# Patient Record
Sex: MALE | Race: WHITE | URBAN - METROPOLITAN AREA
[De-identification: names, ages, dates, MRNs, and addresses within clinical notes are randomized per-mention and may not be internally consistent; named-entity substitution may affect disease eponyms.]

---

## 2017-01-25 ENCOUNTER — OFFICE VISIT (OUTPATIENT)
Dept: SLEEP MEDICINE | Facility: CLINIC | Age: 32
End: 2017-01-25
Attending: INTERNAL MEDICINE
Payer: COMMERCIAL

## 2017-01-25 VITALS
BODY MASS INDEX: 30.61 KG/M2 | RESPIRATION RATE: 16 BRPM | WEIGHT: 226 LBS | HEIGHT: 72 IN | HEART RATE: 60 BPM | DIASTOLIC BLOOD PRESSURE: 67 MMHG | SYSTOLIC BLOOD PRESSURE: 120 MMHG | OXYGEN SATURATION: 97 %

## 2017-01-25 DIAGNOSIS — G47.21 SLEEP PHASE SYNDROME, DELAYED: ICD-10-CM

## 2017-01-25 DIAGNOSIS — R06.83 SNORING: ICD-10-CM

## 2017-01-25 DIAGNOSIS — F45.8 BRUXISM: ICD-10-CM

## 2017-01-25 DIAGNOSIS — R06.81 APNEA: Primary | ICD-10-CM

## 2017-01-25 DIAGNOSIS — R53.82 CHRONIC FATIGUE: ICD-10-CM

## 2017-01-25 PROCEDURE — 99211 OFF/OP EST MAY X REQ PHY/QHP: CPT | Mod: ZF

## 2017-01-25 RX ORDER — ZOLPIDEM TARTRATE 10 MG/1
TABLET ORAL
Qty: 1 TABLET | Refills: 0 | Status: SHIPPED | OUTPATIENT
Start: 2017-01-25

## 2017-01-25 NOTE — PROGRESS NOTES
"New Sleep Patient Note:    Date on this visit: 1/25/2017    Palomo Long is self-referred for difficulty with morning awakenings and concern for possible obstructive sleep apnea.    Primary Physician: He does not have a primary care provider    CC:  \"I often feel fatigued in the morning.  I suspect I have sleep apnea.\"    Palomo Long 31 year old complains of have a hard getting up in the morning as well as gasping for air sometimes as he is falling asleep.  The gasping for air has been going on for the past few months.  Palomo also complains of snoring.  Snoring has been going on for the last couple years.  He has never been told by family members or a roommate in college that he snores.  Sometimes his wife will have to shake him because he snores loudly and it disrupts her sleep.  In addition to snoring, he has snort arousals (only after drinking alcohol), dry mouth, mouth breathing, morning headaches (1/month), non-refreshing sleep, daytime fatigue. Denies any witnessed apneas.  Denies any significant weight gain or trauma to the neck/airway.  He has lost about 20 pounds from working out, eating healthy, and quitting alcohol.  He has not had a previous sleep study.  A co-worker was recently diagnosed with ANNA and started on PAP therapy.  He was concerned that he may have it as well.    Palomo goes to bed at 11PM to 12 AM.  It usually takes him 15 minutes or less to fall asleep.  He wakes up at 7AM to 8AM with an alarm.  He will hit the snooze button 2-3 times.  He uses a \"light alarm.\"  He feels very tired in the morning.  Caffeine will usually help him wake up.  Bedtime routine conists of being on the computer in the basement.  He then goes upstairs to bed.  The bedroom is quiet and dark. Denies any electronics in bed or TV in the room.  He has used a sleep aid in the past.  He took 3mg melatonin right before bed.  He used or it intermittently within the past year then stopped.  On the weekends/vacation, he " falls asleep at 1AM to 2 AM and gets up 9:30AM to 10AM.  Patient describes himmself as a night person.  He would prefer to go to sleep at 1-2:00 AM and wake up at 10:00 AM.  He denies any complaints of difficulty with falling asleep.  However, he does have issues with staying asleep.  He wakes up 3-5 times throughout the night, about 2 nights in a row, about twice per month.  Night time awakenings occurs due to unspecific causes.  Denies any pain, RLS, racing thoughts/stress/anxiety at night.  States that when he wakes up often and feels as if he never fell asleep.  Patient does have a regular bed partner.  Patient sleeps on his side.  If he sleeps on his back, the gasping will occur more often.  Patient does have a pet in the bedroom at night during sleep.  Denies any awakenings from the dog at night.    Palomo naps 1-2 times per month for  minutes, feels refreshed after naps.  If any longer then he will not feel refreshed.  He does not take inadvertant naps.  He denies closing eyes, dozing and falling asleep while driving.  There are times when he is sleepy at work and will want to take a nap.  He will just drink some caffeine, which will help him stay awake.  Patient's [place] Sleepiness score 6/24 consistent with mild daytime sleepiness.  He denies any cataplexy, sleep paralysis, sleep hallucinations.  Palomo does not complain of restlessness feelings in the legs/arms, worse at night when trying to sleep.  He does not complain of spontaneous leg movements/jerks in the middle of the night.  He will have hynpic jerks at times, once per week.  He does not have a history of anemia.  He denies any night time behaviors -  sleep talking, sleep eating.  He has been told that he clenches his teeth at night.  He used to have sleep walking episodes after he had been drinking alcohol.  He quit drinking and has not had any episodes.  He does not complain acting out dreams.  Patient denies any injury to oneself or others  "while sleeping.  Patient has not fallen out of bed.  Palomo currently works as a analyst.  His work schedule is as follows:  9am to 5pm.  He has not done any shift work in the past.  He drinks coffee, tea, and soda.  He used to drink energy drinks.  He drinks about 200mg caffeine per day.  Last caffeine intake is usually before 3pm.  He does not drink alcohol.  Patient does not smoke.  He did smoke \"socially,\" stopped about 4 years ago.  He does not have any future surgeries planned.  He has a had his wisdom teeth removed about 12-13 years ago, under sedation.  He was not told he had any apneic events at that time.  He does admit that he has some anxiety symptoms.  He has not been evaluated or on any medications for anxiety currently, or in the past.    Allergies:    No Known Allergies    Medications:    Current Outpatient Prescriptions   Medication Sig Dispense Refill     imiquimod (ALDARA) 5 % cream Apply a small sized amount to warts or molluscum three times weekly at bedtime.   Wash off after 8 hours.   May use for up to 16 weeks. 12 packet 3       Problem List:  There are no active problems to display for this patient.       Past Medical/Surgical History:  History reviewed. No pertinent past medical history.  Past Surgical History   Procedure Laterality Date     [place] teeth         Social History:  Social History     Social History     Marital Status:      Spouse Name: N/A     Number of Children: N/A     Years of Education: N/A     Occupational History     Not on file.     Social History Main Topics     Smoking status: Never Smoker      Smokeless tobacco: Not on file     Alcohol Use: No     Drug Use: No     Sexual Activity:     Partners: Female     Birth Control/ Protection: None     Other Topics Concern     Not on file     Social History Narrative       Family History:  Family History   Problem Relation Age of Onset     Family History Negative Mother      Family History Negative Father      HEART DISEASE " "Maternal Grandmother      HEART DISEASE Maternal Grandfather      DIABETES Paternal Grandmother      HEART DISEASE Paternal Grandmother      HEART DISEASE Paternal Grandfather      Parkinsonism Paternal Grandfather    No known sleep disorders    Review of Systems:  A complete review of systems reviewed by me is negative with the exeption of what has been mentioned in the history of present illness.  CONSTITUTIONAL: NEGATIVE for weight gain, fever, chills, sweats or night sweats, drug allergies.  EYES: NEGATIVE for changes in vision, blind spots, double vision.  ENT: NEGATIVE for ear pain, sore throat, sinus pain, post-nasal drip, runny nose, bloody nose  CARDIAC: NEGATIVE for fast heartbeats or fluttering in chest, breathlessness when lying flat, swollen legs or swollen feet.  POSITIVE occasional chest pressure/pain   NEUROLOGIC: NEGATIVE headaches, weakness or numbness in the arms or legs.  DERMATOLOGIC: NEGATIVE for rashes, new moles or change in mole(s)  PULMONARY: NEGATIVE SOB at rest, SOB with activity, dry cough, productive cough, coughing up blood, wheezing or whistling when breathing.    GASTROINTESTINAL: NEGATIVE for nausea or vomitting, fat or grease in stools, constipation, abdominal pain, bowel movements black in color or blood noted.  POSITIVE  loose or watery stools  GENITOURINARY: NEGATIVE for pain during urination, blood in urine, urinating more frequently than usual, irregular menstrual periods.  MUSCULOSKELETAL: NEGATIVE for muscle pain, bone or joint pain, swollen joints.  ENDOCRINE: NEGATIVE for increased thirst or urination, diabetes.  LYMPHATIC: NEGATIVE for swollen lymph nodes, lumps or bumps in the breasts or nipple discharge.    Physical Examination:  Vitals: Pulse 60  Resp 16  Ht 1.835 m (6' 0.25\")  Wt 102.513 kg (226 lb)  BMI 30.44 kg/m2  SpO2 97%  BMI= Body mass index is 30.44 kg/(m^2).         [place] Total Score 1/25/2017   Total score - [place] 6         Mallampati Class: " II.  Tonsillar Stage: 1  hidden by pillars.    General: No apparent distress, appropriately groomed  Head: Normocephalic, atraumatic  Eyes: no icterus, PERRL  Nose: Nares patent. Opening more narrowed on the left, No exudate/erythema  Mouth: op pink and moist, teeth: normal bite  Orophraynx: Opening is not narrowed  Neck: Supple, Circumference: 16 inches  Cardiac: Regular rate and rhythm  Chest: Symmetric air movement, lungs clear to auscultation bilaterally  GI: Abdomen soft, non-tender, non-distended  Musculoskeletal: noedema noted  Skin: Warm, dry, intact  Psych: Mood pleasant, affect congruent  Mental status: Awake, alert, attentive, oriented.  Cranial nerves: CN2-12 tested and no significant findings appreciated.  Motor: Tone within normal limit  Gait: Normal width, stride length       Impression/Plan:    Snoring  Possible Obstructive Sleep Apnea  Delayed Sleep Phase Type  Insomnia - Psychophysiologic, Delayed Sleep Phase , Anxiety  Bruxism    31M here for evaluation for possible sleep apnea.  He is intermediate risk for ANNA, STOP-BANG is 3.  He has loud, disruptive snoring, choking/gasping for air, non-refreshing sleep, daytime fatigue.  For diagnosis, he has two diagnostic options.  In-lab study will be more sensitive in a person who is intermediate risk, which has been ordered.  Bruxism can also be monitored on the PSG.  If not eligible for insurance reasons, he should undergo Home Sleep Testing.  He has been explained the diagnosis and treatment of ANNA.  He has also been educated on the risks of untreated ANNA.  He will follow up in clinic to discuss results and treatment options best suited for him.    He also has a clinical history suggestive of Delayed Sleep Phase Type.  Treatment options include Melatonin and Light Therapy.  He has been instructed to take Melatonin (0.5mg to 1mg) at 8pm.  He will then take it 30 minutes earlier the next week and continue until desired bed time.  He has been instructed  not to drive after he takes the medication, as this can make him drowsy.  He should use a light box (10,000 lux) in the morning, after 8am, for about 30min to 1 hour.  He has been counseled that if he were to start this therapy, he needs to be consistent in order for this treatment to work.    He has a couple days of sleep maintenance insomnia a month.  He is unable to provide any specific details on the cause.  This could be related to uncontrolled ANNA and Delayed Sleep Phase Type.  Although there is no formal diagnosis of Anxiety, this could be contributing as well.  Encouraged him to follow good sleep hygiene techniques (sleep hygiene and stimulus control).  He would also benefit from Cognitive Behavioral Therapy.  This can be further explored at the next visit.  If Anxiety is an issues, he would benefit from evaluation from Psychiatry for optimal management.    Patient was strongly advised to avoid driving, operating any heavy machinery or other hazardous situations while drowsy or sleepy.  Patient was counseled on the importance of driving while alert, to pull over if drowsy, or nap before getting into the vehicle if sleepy.    He will follow up with me in approximately two weeks after his sleep study has been competed to review the results and discuss plan of care.      Seen and examined with Dr. Veena Jacobo  Clinical Sleep Medicine Fellow    Attending: Patient seen and examined and personally counseled re above..  PSG vs HSAT testing sensitivity and indications personally reviewed with patient. This note reflects our mutual assessment and plan.  Crista Curiel MD  Pulmonary, Critical Care, and Sleep Medicine

## 2017-01-25 NOTE — NURSING NOTE
"Chief Complaint   Patient presents with     Consult     Discuss fatigue and possible sleep apnea       Initial There were no vitals taken for this visit. Estimated body mass index is 30.04 kg/(m^2) as calculated from the following:    Height as of 10/20/16: 1.835 m (6' 0.25\").    Weight as of 10/20/16: 101.152 kg (223 lb).  BP completed using cuff size: large      Neck circumference: 16 inches.  40.5 cm    [address]          "

## 2017-01-25 NOTE — PATIENT INSTRUCTIONS
"MY TREATMENT INFORMATION FOR SLEEP APNEA-  Palomo Long    DOCTOR : Crista Curiel  SLEEP CENTER :      MY CONTACT NUMBER:       If I haven't had a sleep study yet, what can I expect?  A personal story from [employer]  https://www.[employer].com/watch?v=AxPLmlRpnCs    Am I having a home sleep study?  Here is a video in case you get home and want to make sure you have done it correctly  https://www.[employer].com/watch?v=ESV8X2jPbh3&feature=youtu.be    Frequently asked questions:  1. What is Obstructive Sleep Apnea (ANNA)? ANNA is the most common type of sleep apnea. Apnea literally means, \"without breath.\" It is characterized by repetitive pauses in breathing, despite continued effort to breathe, and is usually associated with a reduction in blood oxygen saturation. Apneas can last 10 to over 60 seconds. It is caused by narrowing or collapse of the upper airway as muscles relax during sleep. Severity of sleep apnea is determined by frequency of breathing events and their effect on your sleep and oxygen levels determined during sleep testing.   2. What are the consequences of ANNA? Symptoms include: daytime sleepiness- possibly increasing the risk of falling asleep while driving, unrefreshing/restless sleep, snoring, insomnia, waking frequently to urinate, waking with heartburn or reflux, reduced concentration and memory, and morning headaches. Other health consequences may include development of high blood pressure and other cardiovascular disease in persons who are susceptible. Untreated ANNA  can contribute to heart disease, stroke and diabetes.   3. What are the treatment options? In most situations, sleep apnea is a lifelong disease that must be managed with daily therapy. Medications are not effective for sleep apnea and surgery is generally not performed until other therapies have been tried. Therapy is usually tailored to the individual patient based on many factors including your wishes as well as severity of sleep " apnea and severity of obesity. Continuous Positive Airway (CPAP) is the most reliable treatment. An oral device to hold your jaw forward is usually the next most reliable option. Other options include postioning devices (to keep you off your back), weight loss, and surgery including a tongue pacing device. There is more detail about some of these options below.            1. CPAP-  WHAT DOES IT DO AND HOW CAN I LEARN TO WEAR IT?                               BEFORE I START, CAN I WATCH A MOVIE TO GET A PLAN ON HOW TO USE CPAP?  https://www.[employer].com/watch?k=h5E64cn866W      Continuous positive airway pressure, or CPAP, is the most effective treatment for obstructive sleep apnea. It works by blowing room air, through a mask, to hold your throat open. A decision to use CPAP is a major step forward in the pursuit of a healthier life. The successful use of CPAP will help you breathe easier, sleep better and live healthier. You can choose CPAP equipment from any durable medical equipment provider that meets your needs.  Using CPAP can be a positive experience if you keep these lora points in mind:  1. Commitment  CPAP is not a quick fix for your problem. It involves a long-term commitment to improve your sleep and your health.    2. Communication  Stay in close communication with both your sleep doctor and your CPAP supplier. Ask lots of questions and seek help when you need it.    3. Consistency  Use CPAP all night, every night and for every nap. You will receive the maximum health benefits from CPAP when you use it every time that you sleep. This will also make it easier for your body to adjust to the treatment.    4. Correction  The first machine and mask that you try may not be the best ones for you. Work with your sleep doctor and your CPAP supplier to make corrections to your equipment selection. Ask about trying a different type of machine or mask if you have ongoing problems. Make sure that your mask is a good  "fit and learn to use your equipment properly.    5. Challenge  Tell a family member or close friend to ask you each morning if you used your CPAP the previous night. Have someone to challenge you to give it your best effort.    6. Connection   Your adjustment to CPAP will be easier if you are able to connect with others who use the same treatment. Ask your sleep doctor if there is a support group in your area for people who have sleep apnea, or look for one on the Internet.  7. Comfort   Increase your level of comfort by using a saline spray, decongestant or heated humidifier if CPAP irritates your nose, mouth or throat. Use your unit's \"ramp\" setting to slowly get used to the air pressure level. There may be soft pads you can buy that will fit over your mask straps. Look on www.CPAP.com for accessories that can help make CPAP use more comfortable.  8. Cleaning   Clean your mask, tubing and headgear on a regular basis. Put this time in your schedule so that you don't forget to do it. Check and replace the filters for your CPAP unit and humidifier.    9. Completion   Although you are never finished with CPAP therapy, you should reward yourself by celebrating the completion of your first month of treatment. Expect this first month to be your hardest period of adjustment. It will involve some trial and error as you find the machine, mask and pressure settings that are right for you.    10. Continuation  After your first month of treatment, continue to make a daily commitment to use your CPAP all night, every night and for every nap.    CPAP-Tips to starting with success:  Begin using your CPAP for short periods of time during the day while you watch TV or read.    Use CPAP every night and for every nap. Using it less often reduces the health benefits and makes it harder for your body to get used to it.    Make small adjustments to your mask, tubing, straps and headgear until you get the right fit. Tightening the mask " may actually worsen the leak.  If it leaves significant marks on your face or irritates the bridge of your nose, it may not be the best mask for you.  Speak with the person who supplied the mask and consider trying other masks. Insurances will allow you to try different masks during the first month of starting CPAP.  Insurance also covers a new mask, hose and filter about every 6 months.    Use a saline nasal spray to ease mild nasal congestion. Neti-Pot or saline nasal rinses may also help. Nasal gel sprays can help reduce nasal dryness.  Biotene mouthwash can be helpful to protect your teeth if you experience frequent dry mouth.  Dry mouth may be a sign of air escaping out of your mouth or out of the mask in the case of a full face mask.  Speak with your provider if you expect that is the case.     Take a nasal decongestant to relieve more severe nasal or sinus congestion.  Do not use Afrin (oxymetazoline) nasal spray more than 3 days in a row.  Speak with your sleep doctor if your nasal congestion is chronic.    Use a heated humidifier that fits your CPAP model to enhance your breathing comfort. Adjust the heat setting up if you get a dry nose or throat, down if you get condensation in the hose or mask.  Position the CPAP lower than you so that any condensation in the hose drains back into the machine rather than towards the mask.    Try a system that uses nasal pillows if traditional masks give you problems.    Clean your mask, tubing and headgear once a week. Make sure the equipment dries fully.    Regularly check and replace the filters for your CPAP unit and humidifier.    Work closely with your sleep provider and your CPAP supplier to make sure that you have the machine, mask and air pressure setting that works best for you. It is better to stop using it and call your provider to solve problems than to lay awake all night frustrated with the device.    BESIDES CPAP, WHAT OTHER THERAPIES ARE  THERE?      Positioning Device  Positioning devices are generally used when sleep apnea is mild and only occurs on your back.This example shows a pillow that straps around the waist. It may be appropriate for those whose sleep study shows milder sleep apnea that occurs primarily when lying flat on one's back. Preliminary studies have shown benefit but effectiveness at home may need to be verified by a home sleep test. These devices are generally not covered by medical insurance.                      Oral Appliance  What is oral appliance therapy?  An oral appliance is a small acrylic device that fits over the upper and lower teeth or tongue (similar to an orthodontic retainer or a mouth guard). This device slightly advances the lower jaw or tongue, which moves the base of the tongue forward, opens the airway, improves breathing and can effectively treat snoring and obstructive sleep apnea sleep apnea. The appliance is fabricated and customized by a qualified dentist with experience in treating snoring and sleep apnea. Oral appliances are usually well tolerated and have relatively high compliance by patients1, 2, 3.  When is an oral appliance indicated?  Oral appliance therapy is recommended as a first-line treatment for patients with primary snoring, mild sleep apnea, and for patients with moderate sleep apnea who prefer appliance therapy to use of CPAP4, 5. Severity of sleep apnea is determined by sleep testing and is based on the number of respiratory events per hour of sleep.   How successful is oral appliance therapy?  The success rate of oral appliance therapy in patients with mild sleep apnea is 75-80% while in patients with moderate sleep apnea it is 50-70%. The chance of success in patients with severe sleep apnea is 40-50%. The research also shows that oral appliances have a beneficial effect on the cardiovascular health of ANNA patients at the same magnitude as CPAP therapy7.  Oral appliances should be a  second-line treatment in cases of severe sleep apnea, but if not completely successful then a combination therapy utilizing CPAP plus oral appliance therapy may be effective. Oral appliances tend to be effective in a broad range of patients although studies show that the patients who have the highest success are females, younger patients, those with milder disease, and less severe obesity. 3, 6.   The chances of success are lower in patients who have more severe ANNA, are older, and those who are morbidly obese.     Example of an oral appliance   Finding a dentist that practices dental sleep medicine  Specific training is available through the American Academy of Dental Sleep Medicine for dentists interested in working in the field of sleep. To find a dentist who is educated in the field of sleep and the use of oral appliances, near you, visit the Web site of the American Academy of Dental Sleep Medicine; also see   http://www.accpstorage.org/newOrganization/patients/oralAppliances.pdf  To search for a dentist certified in these practices:  Http://aadsm.org/FindADentist.aspx?1  1. Korina et al. Objectively measured vs self-reported compliance during oral appliance therapy for sleep-disordered breathing. Chest 2013; 144(5): 4519-4120.  2. Brock, et al. Objective measurement of compliance during oral appliance therapy for sleep-disordered breathing. Thorax 2013; 68(1): 91-96.  3. Karen, et al. Mandibular advancement devices in 620 men and women with ANNA and snoring: tolerability and predictors of treatment success. Chest 2004; 125: 1716-4626.  4. Nahum, et al. Oral appliances for snoring and ANNA: a review. Sleep 2006; 29: 244-262.  5. Sushila et al. Oral appliance treatment for ANNA: an update. J Clin Sleep Med 2014; 10(2): 215-227.  6. Silvia et al. Predictors of OSAH treatment outcome. J Dent Res 2007; 86: 9716-1089.      Surgery:    Upper Airway Surgery for ANNA  Surgery for ANNA is a second-line  treatment option in the management of sleep apnea.  Surgery should be considered for patients who are having a difficult time tolerating CPAP.    Surgery for ANNA is directed at areas that are responsible for narrowing or complete obstruction of the airway during sleep.  There are a wide range of procedures available to enlarge and/or stabilize the airway to prevent blockage of breathing in the three major areas where it can occur: the palate, tongue, and nasal regions.  Successful surgical treatment depends on the accurate identification of the factors responsible for obstructive sleep apnea in each person.  A personalized approach is required because there is no single treatment that works well for everyone.  Because of anatomic variation, consultation with an examination by a sleep surgeon is a critical first step in determining what surgical options are best for each patient.  In some cases, examination during sedation may be recommended in order to guide the selection of procedures.  Patients will be counseled about risks and benefits as well as the typical recovery course after surgery. Surgery is typically not a cure for a person s ANNA.  However, surgery will often significantly improve one s ANNA severity (termed  success rate ).  Even in the absence of a cure, surgery will decrease the cardiovascular risk associated with OSA7; improve overall quality of life8 (sleepiness, functionality, sleep quality, etc).          Palate Procedures:  Patients with ANNA often have narrowing of their airway in the region of their tonsils and uvula.  The goals of palate procedures are to widen the airway in this region as well as to help the tissues resist collapse.  Modern palate procedure techniques focus on tissue conservation and soft tissue rearrangement, rather than tissue removal.  Often the uvula is preserved in this procedure. Residual sleep apnea is common in patient after pharyngoplasty with an average reduction in  sleep apnea events of 33%2.      Tongue Procedures:  While patients are awake, the muscles that surround the throat are active and keep this region open for breathing. These muscles relax during sleep, allowing the tongue and other structures to collapse and block breathing.  There are several different tongue procedures available.  Selection of a tongue base procedure depends on characteristics seen on physical exam.  Generally, procedures are aimed at removing bulky tissues in this area or preventing the back of the tongue from falling back during sleep.  Success rates for tongue surgery range from 50-62%3.    Hypoglossal Nerve Stimulation:  Hypoglossal nerve stimulation has recently received approval from the [place] Food and Drug Administration for the treatment of obstructive sleep apnea.  This is based on research showing that the system was safe and effective in treating sleep apnea6.  Results showed that the median AHI score decreased 68%, from 29.3 to 9.0. This therapy uses an implant system that senses breathing patterns and delivers mild stimulation to airway muscles, which keeps the airway open during sleep.  The system consists of three fully implanted components: a small generator (similar in size to a pacemaker), a breathing sensor, and a stimulation lead.  Using a small handheld remote, a patient turns the therapy on before bed and off upon awakening.    Candidates for this device must be greater than 22 years of age, have moderate to severe ANNA (AHI between 20-65), BMI less than 32, have tried CPAP/oral appliance without success, and have appropriate upper airway anatomy (determined by a sleep endoscopy performed by Dr. Mcqueen).    Hypoglossal Nerve Stimulation Pathway:    The sleep surgeon s office will work with the patient through the insurance prior-authorization process (including communications and appeals).    Nasal Procedures:  Nasal obstruction can interfere with nasal breathing during  the day and night.  Studies have shown that relief of nasal obstruction can improve the ability of some patients to tolerate positive airway pressure therapy for obstructive sleep apnea1.  Treatment options include medications such as nasal saline, topical corticosteroid and antihistamine sprays, and oral medications such as antihistamines or decongestants. Non-surgical treatments can include external nasal dilators for selected patients. If these are not successful by themselves, surgery can improve the nasal airway either alone or in combination with these other options.      Combination Procedures:  Combination of surgical procedures and other treatments may be recommended, particularly if patients have more than one area of narrowing or persistent positional disease.  The success rate of combination surgery ranges from 66-80%2,3.      1. Steve BRENNAN. The Role of the Nose in Snoring and Obstructive Sleep Apnoea: An Update.  Eur Arch Otorhinolaryngol. 2011; 268: 1365-73.  2.  Silvestre SM; Miracle JA; Asuncion JR; Pallanch JF; Socorro MB; Triny SG; Zhanna PAULINO. Surgical modifications of the upper airway for obstructive sleep apnea in adults: a systematic review and meta-analysis. SLEEP 2010;33(10):8672-0031. Alan FERNANDEZ. Hypopharyngeal surgery in obstructive sleep apnea: an evidence-based medicine review.  Arch Otolaryngol Head Neck Surg. 2006 Feb;132(2):206-13.  3. Richard TOPETEH1, Umu Y, Ze SHAHAB. The efficacy of anatomically based multilevel surgery for obstructive sleep apnea. Otolaryngol Head Neck Surg. 2003 Oct;129(4):327-35.  4. Alan FERNANDEZ, Goldberg A. Hypopharyngeal Surgery in Obstructive Sleep Apnea: An Evidence-Based Medicine Review. Arch Otolaryngol Head Neck Surg. 2006 Feb;132(2):206-13.  5. Alirio HADDAD et al. Upper-Airway Stimulation for Obstructive Sleep Apnea.  N Engl J Med. 2014 Jan 9;370(2):139-49.  6. Sheila Y et al. Increased Incidence of Cardiovascular Disease in Middle-aged Men with Obstructive Sleep Apnea.  Am J Respir Crit Care Med; 2002 166: 159-165  7. Kitty VALDES et al. Studying Life Effects and Effectiveness of Palatopharyngoplasty (SLEEP) study: Subjective Outcomes of Isolated Uvulopalatopharyngoplasty. Otolaryngol Head Neck Surg. 2011; 144: 623-631.              Your BMI is Body mass index is 30.44 kg/(m^2).  Weight management is a personal decision.  If you are interested in exploring weight loss strategies, the following discussion covers the approaches that may be successful. Body mass index (BMI) is one way to tell whether you are at a healthy weight, overweight, or obese. It measures your weight in relation to your height.  A BMI of 18.5 to 24.9 is in the healthy range. A person with a BMI of 25 to 29.9 is considered overweight, and someone with a BMI of 30 or greater is considered obese. More than two-thirds of American adults are considered overweight or obese.  Being overweight or obese increases the risk for further weight gain. Excess weight may lead to heart disease and diabetes.  Creating and following plans for healthy eating and physical activity may help you improve your health.  Weight control is part of healthy lifestyle and includes exercise, emotional health, and healthy eating habits. Careful eating habits lifelong are the mainstay of weight control. Though there are significant health benefits from weight loss, long-term weight loss with diet alone may be very difficult to achieve- studies show long-term success with dietary management in less than 10% of people. Attaining a healthy weight may be especially difficult to achieve in those with severe obesity. In some cases, medications, devices and surgical management might be considered.  What can you do?  If you are overweight or obese and are interested in methods for weight loss, you should discuss this with your provider.     Consider reducing daily calorie intake by 500 calories.     Keep a food journal.     Avoiding skipping meals,  consider cutting portions instead.    Diet combined with exercise helps maintain muscle while optimizing fat loss. Strength training is particularly important for building and maintaining muscle mass. Exercise helps reduce stress, increase energy, and improves fitness. Increasing exercise without diet control, however, may not burn enough calories to loose weight.       Start walking three days a week 10-20 minutes at a time    Work towards walking thirty minutes five days a week     Eventually, increase the speed of your walking for 1-2 minutes at time    In addition, we recommend that you review healthy lifestyles and methods for weight loss available through the National Institutes of Health patient information sites:  http://win.niddk.nih.gov/publications/index.htm    And look into health and wellness programs that may be available through your health insurance provider, employer, local community center, or [employer].    Weight management plan: Patient was referred to their PCP to discuss a diet and exercise plan.

## 2017-01-25 NOTE — MR AVS SNAPSHOT
"              After Visit Summary   1/25/2017    Palomo Long    MRN: [id number]           Patient Information     Date Of Birth          1985        Visit Information        Provider Department      1/25/2017 1:00 PM Hilario Jacobo MD [facility], [place], Sleep Study        Today's Diagnoses     Snoring    -  1       Care Instructions    MY TREATMENT INFORMATION FOR SLEEP APNEA-  Palomo Long    DOCTOR : Crista Curiel  SLEEP CENTER :      MY CONTACT NUMBER:       If I haven't had a sleep study yet, what can I expect?  A personal story from [employer]  https://www.[employer].com/watch?v=AxPLmlRpnCs    Am I having a home sleep study?  Here is a video in case you get home and want to make sure you have done it correctly  https://www.[employer].com/watch?v=LMB1Y9wDzk7&feature=youtu.be    Frequently asked questions:  1. What is Obstructive Sleep Apnea (ANNA)? ANNA is the most common type of sleep apnea. Apnea literally means, \"without breath.\" It is characterized by repetitive pauses in breathing, despite continued effort to breathe, and is usually associated with a reduction in blood oxygen saturation. Apneas can last 10 to over 60 seconds. It is caused by narrowing or collapse of the upper airway as muscles relax during sleep. Severity of sleep apnea is determined by frequency of breathing events and their effect on your sleep and oxygen levels determined during sleep testing.   2. What are the consequences of ANNA? Symptoms include: daytime sleepiness- possibly increasing the risk of falling asleep while driving, unrefreshing/restless sleep, snoring, insomnia, waking frequently to urinate, waking with heartburn or reflux, reduced concentration and memory, and morning headaches. Other health consequences may include development of high blood pressure and other cardiovascular disease in persons who are susceptible. Untreated ANNA  can contribute to heart disease, stroke and diabetes.   3. What are the treatment options? " In most situations, sleep apnea is a lifelong disease that must be managed with daily therapy. Medications are not effective for sleep apnea and surgery is generally not performed until other therapies have been tried. Therapy is usually tailored to the individual patient based on many factors including your wishes as well as severity of sleep apnea and severity of obesity. Continuous Positive Airway (CPAP) is the most reliable treatment. An oral device to hold your jaw forward is usually the next most reliable option. Other options include postioning devices (to keep you off your back), weight loss, and surgery including a tongue pacing device. There is more detail about some of these options below.            1. CPAP-  WHAT DOES IT DO AND HOW CAN I LEARN TO WEAR IT?                               BEFORE I START, CAN I WATCH A MOVIE TO GET A PLAN ON HOW TO USE CPAP?  https://www.[employer].com/watch?o=e2O29eo447Q      Continuous positive airway pressure, or CPAP, is the most effective treatment for obstructive sleep apnea. It works by blowing room air, through a mask, to hold your throat open. A decision to use CPAP is a major step forward in the pursuit of a healthier life. The successful use of CPAP will help you breathe easier, sleep better and live healthier. You can choose CPAP equipment from any durable medical equipment provider that meets your needs.  Using CPAP can be a positive experience if you keep these lora points in mind:  1. Commitment  CPAP is not a quick fix for your problem. It involves a long-term commitment to improve your sleep and your health.    2. Communication  Stay in close communication with both your sleep doctor and your CPAP supplier. Ask lots of questions and seek help when you need it.    3. Consistency  Use CPAP all night, every night and for every nap. You will receive the maximum health benefits from CPAP when you use it every time that you sleep. This will also make it easier for  "your body to adjust to the treatment.    4. Correction  The first machine and mask that you try may not be the best ones for you. Work with your sleep doctor and your CPAP supplier to make corrections to your equipment selection. Ask about trying a different type of machine or mask if you have ongoing problems. Make sure that your mask is a good fit and learn to use your equipment properly.    5. Challenge  Tell a family member or close friend to ask you each morning if you used your CPAP the previous night. Have someone to challenge you to give it your best effort.    6. Connection   Your adjustment to CPAP will be easier if you are able to connect with others who use the same treatment. Ask your sleep doctor if there is a support group in your area for people who have sleep apnea, or look for one on the Internet.  7. Comfort   Increase your level of comfort by using a saline spray, decongestant or heated humidifier if CPAP irritates your nose, mouth or throat. Use your unit's \"ramp\" setting to slowly get used to the air pressure level. There may be soft pads you can buy that will fit over your mask straps. Look on www.CPAP.com for accessories that can help make CPAP use more comfortable.  8. Cleaning   Clean your mask, tubing and headgear on a regular basis. Put this time in your schedule so that you don't forget to do it. Check and replace the filters for your CPAP unit and humidifier.    9. Completion   Although you are never finished with CPAP therapy, you should reward yourself by celebrating the completion of your first month of treatment. Expect this first month to be your hardest period of adjustment. It will involve some trial and error as you find the machine, mask and pressure settings that are right for you.    10. Continuation  After your first month of treatment, continue to make a daily commitment to use your CPAP all night, every night and for every nap.    CPAP-Tips to starting with success:  Begin " using your CPAP for short periods of time during the day while you watch TV or read.    Use CPAP every night and for every nap. Using it less often reduces the health benefits and makes it harder for your body to get used to it.    Make small adjustments to your mask, tubing, straps and headgear until you get the right fit. Tightening the mask may actually worsen the leak.  If it leaves significant marks on your face or irritates the bridge of your nose, it may not be the best mask for you.  Speak with the person who supplied the mask and consider trying other masks. Insurances will allow you to try different masks during the first month of starting CPAP.  Insurance also covers a new mask, hose and filter about every 6 months.    Use a saline nasal spray to ease mild nasal congestion. Neti-Pot or saline nasal rinses may also help. Nasal gel sprays can help reduce nasal dryness.  Biotene mouthwash can be helpful to protect your teeth if you experience frequent dry mouth.  Dry mouth may be a sign of air escaping out of your mouth or out of the mask in the case of a full face mask.  Speak with your provider if you expect that is the case.     Take a nasal decongestant to relieve more severe nasal or sinus congestion.  Do not use Afrin (oxymetazoline) nasal spray more than 3 days in a row.  Speak with your sleep doctor if your nasal congestion is chronic.    Use a heated humidifier that fits your CPAP model to enhance your breathing comfort. Adjust the heat setting up if you get a dry nose or throat, down if you get condensation in the hose or mask.  Position the CPAP lower than you so that any condensation in the hose drains back into the machine rather than towards the mask.    Try a system that uses nasal pillows if traditional masks give you problems.    Clean your mask, tubing and headgear once a week. Make sure the equipment dries fully.    Regularly check and replace the filters for your CPAP unit and  humidifier.    Work closely with your sleep provider and your CPAP supplier to make sure that you have the machine, mask and air pressure setting that works best for you. It is better to stop using it and call your provider to solve problems than to lay awake all night frustrated with the device.    BESIDES CPAP, WHAT OTHER THERAPIES ARE THERE?      Positioning Device  Positioning devices are generally used when sleep apnea is mild and only occurs on your back.This example shows a pillow that straps around the waist. It may be appropriate for those whose sleep study shows milder sleep apnea that occurs primarily when lying flat on one's back. Preliminary studies have shown benefit but effectiveness at home may need to be verified by a home sleep test. These devices are generally not covered by medical insurance.                      Oral Appliance  What is oral appliance therapy?  An oral appliance is a small acrylic device that fits over the upper and lower teeth or tongue (similar to an orthodontic retainer or a mouth guard). This device slightly advances the lower jaw or tongue, which moves the base of the tongue forward, opens the airway, improves breathing and can effectively treat snoring and obstructive sleep apnea sleep apnea. The appliance is fabricated and customized by a qualified dentist with experience in treating snoring and sleep apnea. Oral appliances are usually well tolerated and have relatively high compliance by patients1, 2, 3.  When is an oral appliance indicated?  Oral appliance therapy is recommended as a first-line treatment for patients with primary snoring, mild sleep apnea, and for patients with moderate sleep apnea who prefer appliance therapy to use of CPAP4, 5. Severity of sleep apnea is determined by sleep testing and is based on the number of respiratory events per hour of sleep.   How successful is oral appliance therapy?  The success rate of oral appliance therapy in patients with  mild sleep apnea is 75-80% while in patients with moderate sleep apnea it is 50-70%. The chance of success in patients with severe sleep apnea is 40-50%. The research also shows that oral appliances have a beneficial effect on the cardiovascular health of ANNA patients at the same magnitude as CPAP therapy7.  Oral appliances should be a second-line treatment in cases of severe sleep apnea, but if not completely successful then a combination therapy utilizing CPAP plus oral appliance therapy may be effective. Oral appliances tend to be effective in a broad range of patients although studies show that the patients who have the highest success are females, younger patients, those with milder disease, and less severe obesity. 3, 6.   The chances of success are lower in patients who have more severe ANNA, are older, and those who are morbidly obese.     Example of an oral appliance   Finding a dentist that practices dental sleep medicine  Specific training is available through the American Academy of Dental Sleep Medicine for dentists interested in working in the field of sleep. To find a dentist who is educated in the field of sleep and the use of oral appliances, near you, visit the Web site of the American Academy of Dental Sleep Medicine; also see   http://www.accpstorage.org/newOrganization/patients/oralAppliances.pdf  To search for a dentist certified in these practices:  Http://aadsm.org/FindADentist.aspx?1  1. Korina et al. Objectively measured vs self-reported compliance during oral appliance therapy for sleep-disordered breathing. Chest 2013; 144(5): 3128-0996.  2. Brock, et al. Objective measurement of compliance during oral appliance therapy for sleep-disordered breathing. Thorax 2013; 68(1): 91-96.  3. Karen et al. Mandibular advancement devices in 620 men and women with ANNA and snoring: tolerability and predictors of treatment success. Chest 2004; 125: 0091-5694.  4. Nahum et al. Oral  appliances for snoring and ANNA: a review. Sleep 2006; 29: 244-262.  5. Sushila et al. Oral appliance treatment for ANNA: an update. J Clin Sleep Med 2014; 10(2): 215-227.  6. lupe Vega al. Predictors of OSAH treatment outcome. J Dent Res 2007; 86: 9649-1134.      Surgery:    Upper Airway Surgery for ANNA  Surgery for ANNA is a second-line treatment option in the management of sleep apnea.  Surgery should be considered for patients who are having a difficult time tolerating CPAP.    Surgery for ANNA is directed at areas that are responsible for narrowing or complete obstruction of the airway during sleep.  There are a wide range of procedures available to enlarge and/or stabilize the airway to prevent blockage of breathing in the three major areas where it can occur: the palate, tongue, and nasal regions.  Successful surgical treatment depends on the accurate identification of the factors responsible for obstructive sleep apnea in each person.  A personalized approach is required because there is no single treatment that works well for everyone.  Because of anatomic variation, consultation with an examination by a sleep surgeon is a critical first step in determining what surgical options are best for each patient.  In some cases, examination during sedation may be recommended in order to guide the selection of procedures.  Patients will be counseled about risks and benefits as well as the typical recovery course after surgery. Surgery is typically not a cure for a person s ANNA.  However, surgery will often significantly improve one s ANNA severity (termed  success rate ).  Even in the absence of a cure, surgery will decrease the cardiovascular risk associated with OSA7; improve overall quality of life8 (sleepiness, functionality, sleep quality, etc).          Palate Procedures:  Patients with ANNA often have narrowing of their airway in the region of their tonsils and uvula.  The goals of palate procedures are to  widen the airway in this region as well as to help the tissues resist collapse.  Modern palate procedure techniques focus on tissue conservation and soft tissue rearrangement, rather than tissue removal.  Often the uvula is preserved in this procedure. Residual sleep apnea is common in patient after pharyngoplasty with an average reduction in sleep apnea events of 33%2.      Tongue Procedures:  While patients are awake, the muscles that surround the throat are active and keep this region open for breathing. These muscles relax during sleep, allowing the tongue and other structures to collapse and block breathing.  There are several different tongue procedures available.  Selection of a tongue base procedure depends on characteristics seen on physical exam.  Generally, procedures are aimed at removing bulky tissues in this area or preventing the back of the tongue from falling back during sleep.  Success rates for tongue surgery range from 50-62%3.    Hypoglossal Nerve Stimulation:  Hypoglossal nerve stimulation has recently received approval from the [place] Food and Drug Administration for the treatment of obstructive sleep apnea.  This is based on research showing that the system was safe and effective in treating sleep apnea6.  Results showed that the median AHI score decreased 68%, from 29.3 to 9.0. This therapy uses an implant system that senses breathing patterns and delivers mild stimulation to airway muscles, which keeps the airway open during sleep.  The system consists of three fully implanted components: a small generator (similar in size to a pacemaker), a breathing sensor, and a stimulation lead.  Using a small handheld remote, a patient turns the therapy on before bed and off upon awakening.    Candidates for this device must be greater than 22 years of age, have moderate to severe ANNA (AHI between 20-65), BMI less than 32, have tried CPAP/oral appliance without success, and have appropriate  upper airway anatomy (determined by a sleep endoscopy performed by Dr. Mcqueen).    Hypoglossal Nerve Stimulation Pathway:    The sleep surgeon s office will work with the patient through the insurance prior-authorization process (including communications and appeals).    Nasal Procedures:  Nasal obstruction can interfere with nasal breathing during the day and night.  Studies have shown that relief of nasal obstruction can improve the ability of some patients to tolerate positive airway pressure therapy for obstructive sleep apnea1.  Treatment options include medications such as nasal saline, topical corticosteroid and antihistamine sprays, and oral medications such as antihistamines or decongestants. Non-surgical treatments can include external nasal dilators for selected patients. If these are not successful by themselves, surgery can improve the nasal airway either alone or in combination with these other options.      Combination Procedures:  Combination of surgical procedures and other treatments may be recommended, particularly if patients have more than one area of narrowing or persistent positional disease.  The success rate of combination surgery ranges from 66-80%2,3.      1. Steve BRENNAN. The Role of the Nose in Snoring and Obstructive Sleep Apnoea: An Update.  Eur Arch Otorhinolaryngol. 2011; 268: 1365-73.  2.  Silvestre SM; Miracle JA; Asuncion JR; Pallanch JF; Socorro MB; Triny SG; Zhanna PAULINO. Surgical modifications of the upper airway for obstructive sleep apnea in adults: a systematic review and meta-analysis. SLEEP 2010;33(10):8781-8290. Alan FERNANDEZ. Hypopharyngeal surgery in obstructive sleep apnea: an evidence-based medicine review.  Arch Otolaryngol Head Neck Surg. 2006 Feb;132(2):206-13.  3. Richard YH1, Umu Y, Ze GUDINO. The efficacy of anatomically based multilevel surgery for obstructive sleep apnea. Otolaryngol Head Neck Surg. 2003 Oct;129(4):327-35.  4. Alan FERNANDEZ, Goldberg A. Hypopharyngeal Surgery in  Obstructive Sleep Apnea: An Evidence-Based Medicine Review. Arch Otolaryngol Head Neck Surg. 2006 Feb;132(2):206-13.  5. Alirio PJ et al. Upper-Airway Stimulation for Obstructive Sleep Apnea.  N Engl J Med. 2014 Jan 9;370(2):139-49.  6. Sheila Y et al. Increased Incidence of Cardiovascular Disease in Middle-aged Men with Obstructive Sleep Apnea. Am J Respir Crit Care Med; 2002 166: 159-165  7. Kitty EM et al. Studying Life Effects and Effectiveness of Palatopharyngoplasty (SLEEP) study: Subjective Outcomes of Isolated Uvulopalatopharyngoplasty. Otolaryngol Head Neck Surg. 2011; 144: 623-631.              Your BMI is Body mass index is 30.44 kg/(m^2).  Weight management is a personal decision.  If you are interested in exploring weight loss strategies, the following discussion covers the approaches that may be successful. Body mass index (BMI) is one way to tell whether you are at a healthy weight, overweight, or obese. It measures your weight in relation to your height.  A BMI of 18.5 to 24.9 is in the healthy range. A person with a BMI of 25 to 29.9 is considered overweight, and someone with a BMI of 30 or greater is considered obese. More than two-thirds of American adults are considered overweight or obese.  Being overweight or obese increases the risk for further weight gain. Excess weight may lead to heart disease and diabetes.  Creating and following plans for healthy eating and physical activity may help you improve your health.  Weight control is part of healthy lifestyle and includes exercise, emotional health, and healthy eating habits. Careful eating habits lifelong are the mainstay of weight control. Though there are significant health benefits from weight loss, long-term weight loss with diet alone may be very difficult to achieve- studies show long-term success with dietary management in less than 10% of people. Attaining a healthy weight may be especially difficult to achieve in those with severe  obesity. In some cases, medications, devices and surgical management might be considered.  What can you do?  If you are overweight or obese and are interested in methods for weight loss, you should discuss this with your provider.     Consider reducing daily calorie intake by 500 calories.     Keep a food journal.     Avoiding skipping meals, consider cutting portions instead.    Diet combined with exercise helps maintain muscle while optimizing fat loss. Strength training is particularly important for building and maintaining muscle mass. Exercise helps reduce stress, increase energy, and improves fitness. Increasing exercise without diet control, however, may not burn enough calories to loose weight.       Start walking three days a week 10-20 minutes at a time    Work towards walking thirty minutes five days a week     Eventually, increase the speed of your walking for 1-2 minutes at time    In addition, we recommend that you review healthy lifestyles and methods for weight loss available through the National Institutes of Health patient information sites:  http://win.niddk.nih.gov/publications/index.htm    And look into health and wellness programs that may be available through your health insurance provider, employer, local community center, or kodi club.    Weight management plan: Patient was referred to their PCP to discuss a diet and exercise plan.            Follow-ups after your visit        Your next 10 appointments already scheduled     Feb 02, 2017  8:00 PM   PSG Split with SLEEP STUDY RM 4   [facility], Sleep Study ([facility])    [address] [place] [id number] [phone]            Feb 15, 2017  8:00 AM   Return Sleep Patient with Crista Curiel MD   [facility], Sleep Study ([facility])    [address] [place] [id number] [phone]              Future tests that  "were ordered for you today     Open Future Orders        Priority Expected Expires Ordered    Comprehensive Sleep Study Routine  2017            Who to contact     If you have questions or need follow up information about today's clinic visit or your schedule please [facility] directly at [phone].  Normal or non-critical lab and imaging results will be communicated to you by MyChart, letter or phone within 4 business days after the clinic has received the results. If you do not hear from us within 7 days, please contact the clinic through [facility] [employer] or phone. If you have a critical or abnormal lab result, we will notify you by phone as soon as possible.  Submit refill requests through eTipping or call your pharmacy and they will forward the refill request to us. Please allow 3 business days for your refill to be completed.          Additional Information About Your Visit        [employer] Information     e[employer]ipping lets you send messages to your doctor, view your test results, renew your prescriptions, schedule appointments and more. To sign up, go to [email or web address] . Click on \"Log in\" on the left side of the screen, which will take you to the Welcome page. Then click on \"Sign up Now\" on the right side of the page.     You will be asked to enter the access code listed below, as well as some personal information. Please follow the directions to create your username and password.     Your access code is: [email or web address]  Expires: 2017  2:27 PM     Your access code will  in 90 days. If you need help or a new code, please call your [place] clinic or [phone].        Care EveryWhere ID     This is your Care EveryWhere ID. This could be used by other organizations to access your [place] medical records  [id number]        Your Vitals Were     Pulse Respirations Height BMI (Body Mass Index) Pulse Oximetry       60 16 1.835 m (6' 0.25\") 30.44 kg/m2 97%        Blood " Pressure from Last 3 Encounters:   01/25/17 120/67   10/20/16 124/65    Weight from Last 3 Encounters:   01/25/17 102.513 kg (226 lb)   10/20/16 101.152 kg (223 lb)                 Today's Medication Changes          These changes are accurate as of: 1/25/17  2:27 PM.  If you have any questions, ask your nurse or doctor.               Start taking these medicines.        Dose/Directions    zolpidem 10 MG tablet   Commonly known as:  AMBIEN   Started by:  Hilario Jacobo MD        Take tablet by mouth 15 minutes prior to sleep, for Sleep Study   Quantity:  1 tablet   Refills:  0            Where to get your medicines      Some of these will need a paper prescription and others can be bought over the counter.  Ask your nurse if you have questions.     Bring a paper prescription for each of these medications    - zolpidem 10 MG tablet             Primary Care Provider    None Specified       No primary provider on file.        Thank you!     Thank you for choosing [facility], SLEEP STUDY  for your care. Our goal is always to provide you with excellent care. Hearing back from our patients is one way we can continue to improve our services. Please take a few minutes to complete the written survey that you may receive in the mail after your visit with us. Thank you!             Your Updated Medication List - Protect others around you: Learn how to safely use, store and throw away your medicines at www.disposemymeds.org.          This list is accurate as of: 1/25/17  2:27 PM.  Always use your most recent med list.                   Brand Name Dispense Instructions for use    imiquimod 5 % cream    ALDARA    12 packet    Apply a small sized amount to warts or molluscum three times weekly at bedtime.   Wash off after 8 hours.   May use for up to 16 weeks.       zolpidem 10 MG tablet    AMBIEN    1 tablet    Take tablet by mouth 15 minutes prior to sleep, for Sleep Study

## 2017-02-02 ENCOUNTER — THERAPY VISIT (OUTPATIENT)
Dept: SLEEP MEDICINE | Facility: CLINIC | Age: 32
End: 2017-02-02
Attending: INTERNAL MEDICINE
Payer: COMMERCIAL

## 2017-02-02 DIAGNOSIS — R06.83 SNORING: ICD-10-CM

## 2017-02-02 PROCEDURE — 95810 POLYSOM 6/> YRS 4/> PARAM: CPT | Mod: ZF

## 2017-02-02 NOTE — MR AVS SNAPSHOT
After Visit Summary   2/2/2017    Palomo Long    MRN: [id number]           Patient Information     Date Of Birth          1985        Visit Information        Provider Department      2/2/2017 8:00 PM [facility]        Today's Diagnoses     Snoring           Care Instructions    [place] SLE[facility]    1. Your sleep study will be reviewed by a sleep physician within the next few days.     2. Please follow up in the sleep clinic as scheduled, or, make an appointment with your sleep provider to be seen within two weeks to discuss the results of the sleep study.    3. If you have any questions or problems with your treatment plan, please contact your sleep clinic provider at [phone] to further manage your condition.    4. Please review your attached medication list, and, at your follow-up appointment advise your sleep clinic provider about any changes.    5. Go to [email or web address] for more information about your sleep problems.    TIM Cuevas  February 2, 2017              Follow-ups after your visit        Your next 10 appointments already scheduled     Feb 15, 2017  8:00 AM   Return Sleep Patient with Crista Curiel MD   [facility]    [address] [place] [id number] [phone]              Who to contact     If you have questions or need follow up information about today's clinic visit or your schedule please [facility] directly at [phone].  Normal or non-critical lab and imaging results will be communicated to you by MyChart, letter or phone within 4 business days after the clinic has received the results. If you do not hear from us within 7 days, please contact the clinic through MyChart or phone. If you have a critical or abnormal lab result, we will notify you by phone as soon as  "possible.  Submit refill requests through [facility] or call your pharmacy and they will forward the refill request to us. Please allow 3 business days for your refill to be completed.          Additional Information About Your Visit        [employer] Information     [facility] lets you send messages to your doctor, view your test results, renew your prescriptions, schedule appointments and more. To sign up, go to [email or web address] . Click on \"Log in\" on the left side of the screen, which will take you to the Welcome page. Then click on \"Sign up Now\" on the right side of the page.     You will be asked to enter the access code listed below, as well as some personal information. Please follow the directions to create your username and password.     Your access code is: [email or web address]  Expires: 2017  2:27 PM     Your access code will  in 90 days. If you need help or a new code, please call your [place] clinic or [phone].        Care EveryWhere ID     This is your Care EveryWhere ID. This could be used by other organizations to access your [place] medical records  [id number]         Blood Pressure from Last 3 Encounters:   17 120/67   10/20/16 124/65    Weight from Last 3 Encounters:   17 102.513 kg (226 lb)   10/20/16 101.152 kg (223 lb)              We Performed the Following     Comprehensive Sleep Study        Primary Care Provider    None Specified       No primary provider on file.        Thank you!     Thank you for choosing [facility], SLEEP STUDY  for your care. Our goal is always to provide you with excellent care. Hearing back from our patients is one way we can continue to improve our services. Please take a few minutes to complete the written survey that you may receive in the mail after your visit with us. Thank you!             Your Updated Medication List - Protect others around you: Learn how to safely use, store and throw away your medicines at www.disposemymeds.org.    "       This list is accurate as of: 2/2/17  8:47 PM.  Always use your most recent med list.                   Brand Name Dispense Instructions for use    imiquimod 5 % cream    ALDARA    12 packet    Apply a small sized amount to warts or molluscum three times weekly at bedtime.   Wash off after 8 hours.   May use for up to 16 weeks.       zolpidem 10 MG tablet    AMBIEN    1 tablet    Take tablet by mouth 15 minutes prior to sleep, for Sleep Study

## 2017-02-03 NOTE — PROCEDURES
SLEEP STUDY INTERPRETATION  POLYSOMNOGRAPHY REPORT      Patient: Palomo Long  Date of Birth: [birth year]  Study Date: 2/02/17  MRN: [id number]  Referring Provider: Self  Ordering Provider: Hilario Jacobo    Indications for Polysomnography: The patient is a 31 y old Male who is 6' and weighs 226.0 lbs.  His BMI is 30.6, [place] sleepiness scale 6.0 and neck size is 41cm.  No relevant medical history. A diagnostic polysomnogram was performed to evaluate for sleep apnea.    Polysomnogram Data:  A full night polysomnogram recorded the standard physiologic parameters including EEG, EOG, EMG, ECG, nasal and oral airflow.  Respiratory parameters of chest and abdominal movements were recorded with respiratory inductance plethysmography.  Oxygen saturation was recorded by pulse oximetry.      Sleep Architecture: Normal sleep architecture, all stages of sleep observed.  The total recording time of the polysomnogram was 507.4 minutes.  The total sleep time was 454.5 minutes.  Sleep latency was normal at 9.2 minutes with use of a sleep aid (Zolpidem 10mg).  REM latency was 78.5 minutes.  Arousal index was increased at 26.9 arousals per hour.  Sleep efficiency was normal at 89.6%.  Wake after sleep onset was 43.0 minutes.  The patient spent 4.6% of total sleep time in Stage N1, 57.5% in Stage N2, 16.1% in Stages N3, and 21.8% in REM.  Time in REM supine was 24.0 minutes.    Respiration: No significant sleep disordered breathing present.    Events - The polysomnogram revealed a presence of 0 obstructive, 6 central, and 0 mixed apneas resulting in an apnea index of 0.8 events per hour.  There were 3 hypopneas resulting in a hypopnea index of 0.4 events per hour.  The combined apnea/hypopnea index was 1.2 events per hour.  The REM AHI was 1.8 events per hour.  The supine AHI was 1.2 events per hour.  The RERA index was 0.1 events per hour.   The RDI was 1.3 events per hour.    Snoring - was reported as none to  light.    Respiratory rate and pattern - was notable for normal respiratory rate and pattern.    Sustained Sleep Associated Hypoventilation - Transcutaneous carbon dioxide monitoring was not used, however significant hypoventilation was not suggested by oximetry.    Sleep Associated Hypoxemia - (Greater than 5 minutes O2 sat below 89%) was not present.  Baseline oxygen saturation was 95.1%. Lowest oxygen saturation was 89.5%.  Time spent less than or equal to 88% was 0 minutes.  Time spent less than or equal to 89% was 0 minutes.  1.3 0.1 1.2     Movement Activity: No significant PLMs. Excessive transient muscle activity was present in few rare epochs with no associated dream enactment behaviors.    Periodic Limb Activity - There were 54 PLMs during the entire study. The PLM index was 7.1 movements per hour.  The PLM Arousal Index was 1.8 per hour.    REM EMG Activity - Excessive transient muscle activity was present in few rare epochs.    Nocturnal Behavior - Abnormal sleep related behaviors were not noted during / arising out of NREM / REM sleep.    Bruxism - None apparent.    Cardiac Summary: Normal sinus rhythm.  The average pulse rate was 65.5 bpm.  The minimum pulse rate was 48.0 bpm while the maximum pulse rate was 96.6 bpm. The rhythm is normal sinus. Arrhythmias were not noted.      Assessment:     No significant sleep disordered breathing present.    Normal sleep architecture, all stages of sleep observed.    No significant PLMs. Excessive transient muscle activity was present in few rare epochs with no associated dream enactment behaviors.    Normal sinus rhythm.    Recommendations:    Suggest optimizing sleep schedule and avoiding sleep deprivation.    Advise regarding the risks of drowsy driving.    Weight management (if BMI > 30).    Diagnostic Codes:     Unspecified Sleep Disturbance G47.9         Diagnostic Study  Sleep Study 2/02/17 - (226.0 lbs) AHI 1.2, RDI 1.3, Supine AHI 1.2, REM AHI 1.8, Low O2  89.5%, Time Spent ?88% - minutes / Time Spent ?89% - minutes.    Hilario Jacobo DO  Clinical Sleep Medicine Fellow    Attending MD:  PSG was personally reviewed in detail with the Sleep Medicine Fellow.  The above interpretation reflects our joint assessment and recommendations.      _____________________________________   Arpit Deng MD (2/3/2017)             Range(%) Time in range (min) Time in range (%) Time in or below range (min) Time in or below range (%)   0.0 - 89.0 - - - -   0.0 - 88.0 - - - -      1.2 1.2 1.8

## 2017-02-03 NOTE — PATIENT INSTRUCTIONS
[place] SLEEP [facility]    1. Your sleep study will be reviewed by a sleep physician within the next few days.     2. Please follow up in the sleep clinic as scheduled, or, make an appointment with your sleep provider to be seen within two weeks to discuss the results of the sleep study.    3. If you have any questions or problems with your treatment plan, please contact your sleep clinic provider at [phone] to further manage your condition.    4. Please review your attached medication list, and, at your follow-up appointment advise your sleep clinic provider about any changes.    5. Go to [email or web address] for more information about your sleep problems.    Jing Richards, TIM  February 2, 2017

## 2017-02-16 ENCOUNTER — TELEPHONE (OUTPATIENT)
Dept: SLEEP MEDICINE | Facility: CLINIC | Age: 32
End: 2017-02-16

## 2017-04-19 ENCOUNTER — OFFICE VISIT (OUTPATIENT)
Dept: FAMILY MEDICINE | Facility: CLINIC | Age: 32
End: 2017-04-19
Payer: COMMERCIAL

## 2017-04-19 VITALS
DIASTOLIC BLOOD PRESSURE: 73 MMHG | OXYGEN SATURATION: 97 % | HEIGHT: 72 IN | WEIGHT: 236.5 LBS | HEART RATE: 81 BPM | BODY MASS INDEX: 32.03 KG/M2 | SYSTOLIC BLOOD PRESSURE: 119 MMHG | TEMPERATURE: 97.8 F

## 2017-04-19 DIAGNOSIS — Z13.220 LIPID SCREENING: ICD-10-CM

## 2017-04-19 DIAGNOSIS — M94.0 COSTOCHONDRITIS: ICD-10-CM

## 2017-04-19 DIAGNOSIS — R09.A2 FOREIGN BODY SENSATION IN THROAT: Primary | ICD-10-CM

## 2017-04-19 PROCEDURE — 99214 OFFICE O/P EST MOD 30 MIN: CPT | Performed by: INTERNAL MEDICINE

## 2017-04-19 ASSESSMENT — ENCOUNTER SYMPTOMS
SHORTNESS OF BREATH: 0
VOMITING: 0
PALPITATIONS: 0
SORE THROAT: 1
COUGH: 0
CHILLS: 0
NAUSEA: 0
FEVER: 0
HEADACHES: 0

## 2017-04-19 NOTE — PROGRESS NOTES
"HPI Comments:   For the past 2 weeks, patient has a sensation of a foreign body deep into his throat.  Thinks that it may be due to a chicken bone that he may have swallowed.    Chest Pain    This is a recurrent problem. The current episode started more than 1 week ago. The problem occurs rarely. The problem has not changed since onset.Pain location: very localized area left parasternal. The pain is mild. The pain does not radiate. Pertinent negatives include no cough, no fever, no headaches, no nausea, no palpitations, no shortness of breath and no vomiting. He has tried nothing for the symptoms.       Medical history: no family history of premature heart disease      Review of Systems   Constitutional: Negative for chills and fever.   HENT: Positive for sore throat.    Respiratory: Negative for cough and shortness of breath.    Cardiovascular: Positive for chest pain. Negative for palpitations.   Gastrointestinal: Negative for nausea and vomiting.   Neurological: Negative for headaches.       /73 (BP Location: Left arm, Patient Position: Chair, Cuff Size: Adult Large)  Pulse 81  Temp 97.8  F (36.6  C) (Oral)  Ht 6' 0.25\" (1.835 m)  Wt 236 lb 8 oz (107.3 kg)  SpO2 97%  BMI 31.85 kg/m2      Physical Exam   Constitutional: He is oriented to person, place, and time. No distress.   HENT:   Mouth/Throat: Oropharynx is clear and moist. No oropharyngeal exudate.   Cardiovascular: Normal rate, regular rhythm and normal heart sounds.    Pulmonary/Chest: Effort normal and breath sounds normal. No respiratory distress.   Lymphadenopathy:     He has no cervical adenopathy.   Neurological: He is alert and oriented to person, place, and time. GCS score is 15.   Vitals reviewed.        ICD-10-CM    1. Foreign body sensation in throat R09.89 OTOLARYNGOLOGY REFERRAL   2. Costochondritis M94.0    3. Lipid screening Z13.220 Lipid panel reflex to direct LDL   *printed handout for costochondritis handed to the " patient      Patient Instructions   Follow up with ENT.     Call doctor if your chest pain persist/worsens, or if you develop new symptoms.    Follow up yearly or as needed.

## 2017-04-19 NOTE — NURSING NOTE
"Chief Complaint   Patient presents with     Establish Care       Initial /73 (BP Location: Left arm, Patient Position: Chair, Cuff Size: Adult Large)  Pulse 81  Temp 97.8  F (36.6  C) (Oral)  Ht 6' 0.25\" (1.835 m)  Wt 236 lb 8 oz (107.3 kg)  SpO2 97%  BMI 31.85 kg/m2 Estimated body mass index is 31.85 kg/(m^2) as calculated from the following:    Height as of this encounter: 6' 0.25\" (1.835 m).    Weight as of this encounter: 236 lb 8 oz (107.3 kg).  Medication Reconciliation: complete   Yoly Phan MA  "

## 2017-04-19 NOTE — PATIENT INSTRUCTIONS
Follow up with ENT.     Call doctor if your chest pain persist/worsens, or if you develop new symptoms.    Follow up yearly or as needed.

## 2017-04-19 NOTE — MR AVS SNAPSHOT
After Visit Summary   4/19/2017    Palomo Long    MRN: [id number]           Patient Information     Date Of Birth          1985        Visit Information        Provider Department      4/19/2017 3:15 PM Jose Carlos Manzano MD [facility]        Today's Diagnoses     Foreign body sensation in throat    -  1    Lipid screening          Care Instructions    Follow up with ENT.     Call doctor if your chest pain persist/worsens, or if you develop new symptoms.    Follow up yearly or as needed.            Follow-ups after your visit        Additional Services     OTOLARYNGOLOGY REFERRAL       Your provider has referred you to: [facility] Perla Mcgowan [phone]   [email or web address]    Please be aware that coverage of these services is subject to the terms and limitations of your health insurance plan.  Call member services at your health plan with any benefit or coverage questions.      Please bring the following with you to your appointment:    (1) Any X-Rays, CTs or MRIs which have been performed.  Contact the facility where they were done to arrange for  prior to your scheduled appointment.   (2) List of current medications  (3) This referral request   (4) Any documents/labs given to you for this referral                  Who to contact     If you have questions or need follow up information about today's clinic visit or your schedule please contact [facility] directly at [phone].  Normal or non-critical lab and imaging results will be communicated to you by MyChart, letter or phone within 4 business days after the clinic has received the results. If you do not hear from us within 7 days, please contact the clinic through MyChart or phone. If you have a critical or abnormal lab result, we will notify you by phone as soon as possible.  Submit refill requests through [employer] or call your pharmacy and they will forward the  "refill request to us. Please allow 3 business days for your refill to be completed.          Additional Information About Your Visit        MyChart Information     [employer] lets you send messages to your doctor, view your test results, renew your prescriptions, schedule appointments and more. To sign up, go to [email or web address] . Click on \"Log in\" on the left side of the screen, which will take you to the Welcome page. Then click on \"Sign up Now\" on the right side of the page.     You will be asked to enter the access code listed below, as well as some personal information. Please follow the directions to create your username and password.     Your access code is: [email or web address]  Expires: 2017  3:27 PM     Your access code will  in 90 days. If you need help or a new code, please call your [place] clinic or [phone].        Care EveryWhere ID     This is your Care EveryWhere ID. This could be used by other organizations to access your [place] medical records  [id number]        Your Vitals Were     Pulse Temperature Height Pulse Oximetry BMI (Body Mass Index)       81 97.8  F (36.6  C) (Oral) 6' 0.25\" (1.835 m) 97% 31.85 kg/m2        Blood Pressure from Last 3 Encounters:   17 119/73   17 120/67   10/20/16 124/65    Weight from Last 3 Encounters:   17 236 lb 8 oz (107.3 kg)   17 226 lb (102.5 kg)   10/20/16 223 lb (101.2 kg)              We Performed the Following     Lipid panel reflex to direct LDL     OTOLARYNGOLOGY REFERRAL        Primary Care Provider    None Specified       No primary provider on file.        Thank you!     Thank you for choosing [facility]  for your care. Our goal is always to provide you with excellent care. Hearing back from our patients is one way we can continue to improve our services. Please take a few minutes to complete the written survey that you may receive in the mail after your visit with us. Thank you!             Your " Updated Medication List - Protect others around you: Learn how to safely use, store and throw away your medicines at www.disposemymeds.org.          This list is accurate as of: 4/19/17  3:36 PM.  Always use your most recent med list.                   Brand Name Dispense Instructions for use    imiquimod 5 % cream    ALDARA    12 packet    Apply a small sized amount to warts or molluscum three times weekly at bedtime.   Wash off after 8 hours.   May use for up to 16 weeks.       zolpidem 10 MG tablet    AMBIEN    1 tablet    Take tablet by mouth 15 minutes prior to sleep, for Sleep Study